# Patient Record
Sex: MALE | Race: WHITE | NOT HISPANIC OR LATINO | Employment: OTHER | URBAN - NONMETROPOLITAN AREA
[De-identification: names, ages, dates, MRNs, and addresses within clinical notes are randomized per-mention and may not be internally consistent; named-entity substitution may affect disease eponyms.]

---

## 2023-10-04 ENCOUNTER — APPOINTMENT (OUTPATIENT)
Dept: RADIOLOGY | Facility: HOSPITAL | Age: 68
End: 2023-10-04
Payer: OTHER GOVERNMENT

## 2023-10-04 ENCOUNTER — HOSPITAL ENCOUNTER (EMERGENCY)
Facility: HOSPITAL | Age: 68
Discharge: HOME | End: 2023-10-04
Attending: EMERGENCY MEDICINE | Admitting: EMERGENCY MEDICINE
Payer: OTHER GOVERNMENT

## 2023-10-04 ENCOUNTER — HOSPITAL ENCOUNTER (OUTPATIENT)
Dept: RADIOLOGY | Facility: HOSPITAL | Age: 68
Discharge: HOME | End: 2023-10-04
Payer: MEDICARE

## 2023-10-04 VITALS
HEART RATE: 50 BPM | TEMPERATURE: 97.8 F | DIASTOLIC BLOOD PRESSURE: 84 MMHG | WEIGHT: 230 LBS | OXYGEN SATURATION: 93 % | RESPIRATION RATE: 20 BRPM | HEIGHT: 72 IN | SYSTOLIC BLOOD PRESSURE: 142 MMHG | BODY MASS INDEX: 31.15 KG/M2

## 2023-10-04 DIAGNOSIS — R42 VERTIGO: ICD-10-CM

## 2023-10-04 DIAGNOSIS — R11.0 NAUSEA: ICD-10-CM

## 2023-10-04 DIAGNOSIS — R42 DIZZINESS: ICD-10-CM

## 2023-10-04 DIAGNOSIS — R07.9 CHEST PAIN, UNSPECIFIED TYPE: ICD-10-CM

## 2023-10-04 DIAGNOSIS — R42 DIZZINESS: Primary | ICD-10-CM

## 2023-10-04 DIAGNOSIS — H81.10 BENIGN PAROXYSMAL POSITIONAL VERTIGO, UNSPECIFIED LATERALITY: ICD-10-CM

## 2023-10-04 LAB
ALBUMIN SERPL BCP-MCNC: 4.2 G/DL (ref 3.4–5)
ALP SERPL-CCNC: 77 U/L (ref 33–136)
ALT SERPL W P-5'-P-CCNC: 17 U/L (ref 10–52)
ANION GAP SERPL CALC-SCNC: 10 MMOL/L (ref 10–20)
APPEARANCE UR: ABNORMAL
AST SERPL W P-5'-P-CCNC: 14 U/L (ref 9–39)
BASOPHILS # BLD AUTO: 0.08 X10*3/UL (ref 0–0.1)
BASOPHILS NFR BLD AUTO: 0.9 %
BILIRUB SERPL-MCNC: 0.6 MG/DL (ref 0–1.2)
BILIRUB UR STRIP.AUTO-MCNC: NEGATIVE MG/DL
BNP SERPL-MCNC: 41 PG/ML (ref 0–99)
BUN SERPL-MCNC: 19 MG/DL (ref 6–23)
CALCIUM SERPL-MCNC: 9.1 MG/DL (ref 8.6–10.3)
CARDIAC TROPONIN I PNL SERPL HS: 6 NG/L (ref 0–20)
CHLORIDE SERPL-SCNC: 111 MMOL/L (ref 98–107)
CO2 SERPL-SCNC: 24 MMOL/L (ref 21–32)
COLOR UR: YELLOW
CREAT SERPL-MCNC: 1.36 MG/DL (ref 0.5–1.3)
EOSINOPHIL # BLD AUTO: 0.26 X10*3/UL (ref 0–0.7)
EOSINOPHIL NFR BLD AUTO: 2.8 %
ERYTHROCYTE [DISTWIDTH] IN BLOOD BY AUTOMATED COUNT: 12.7 % (ref 11.5–14.5)
GFR SERPL CREATININE-BSD FRML MDRD: 57 ML/MIN/1.73M*2
GLUCOSE SERPL-MCNC: 155 MG/DL (ref 74–99)
GLUCOSE UR STRIP.AUTO-MCNC: NEGATIVE MG/DL
HCT VFR BLD AUTO: 45.2 % (ref 41–52)
HGB BLD-MCNC: 15.2 G/DL (ref 13.5–17.5)
IMM GRANULOCYTES # BLD AUTO: 0.04 X10*3/UL (ref 0–0.7)
IMM GRANULOCYTES NFR BLD AUTO: 0.4 % (ref 0–0.9)
INR PPP: 1 (ref 0.9–1.1)
KETONES UR STRIP.AUTO-MCNC: NEGATIVE MG/DL
LACTATE SERPL-SCNC: 1.6 MMOL/L (ref 0.4–2)
LEUKOCYTE ESTERASE UR QL STRIP.AUTO: NEGATIVE
LYMPHOCYTES # BLD AUTO: 3.53 X10*3/UL (ref 1.2–4.8)
LYMPHOCYTES NFR BLD AUTO: 37.5 %
MAGNESIUM SERPL-MCNC: 1.94 MG/DL (ref 1.6–2.4)
MCH RBC QN AUTO: 32.5 PG (ref 26–34)
MCHC RBC AUTO-ENTMCNC: 33.6 G/DL (ref 32–36)
MCV RBC AUTO: 97 FL (ref 80–100)
MONOCYTES # BLD AUTO: 0.56 X10*3/UL (ref 0.1–1)
MONOCYTES NFR BLD AUTO: 6 %
NEUTROPHILS # BLD AUTO: 4.94 X10*3/UL (ref 1.2–7.7)
NEUTROPHILS NFR BLD AUTO: 52.4 %
NITRITE UR QL STRIP.AUTO: NEGATIVE
NRBC BLD-RTO: 0 /100 WBCS (ref 0–0)
PH UR STRIP.AUTO: 7 [PH]
PLATELET # BLD AUTO: 264 X10*3/UL (ref 150–450)
PMV BLD AUTO: 8.9 FL (ref 7.5–11.5)
POTASSIUM SERPL-SCNC: 3.8 MMOL/L (ref 3.5–5.3)
PROT SERPL-MCNC: 7.3 G/DL (ref 6.4–8.2)
PROT UR STRIP.AUTO-MCNC: NEGATIVE MG/DL
PROTHROMBIN TIME: 11.4 SECONDS (ref 9.8–12.8)
RBC # BLD AUTO: 4.68 X10*6/UL (ref 4.5–5.9)
RBC # UR STRIP.AUTO: NEGATIVE /UL
SODIUM SERPL-SCNC: 141 MMOL/L (ref 136–145)
SP GR UR STRIP.AUTO: 1.02
UROBILINOGEN UR STRIP.AUTO-MCNC: <2 MG/DL
WBC # BLD AUTO: 9.4 X10*3/UL (ref 4.4–11.3)

## 2023-10-04 PROCEDURE — 80053 COMPREHEN METABOLIC PANEL: CPT | Performed by: EMERGENCY MEDICINE

## 2023-10-04 PROCEDURE — 83605 ASSAY OF LACTIC ACID: CPT | Performed by: EMERGENCY MEDICINE

## 2023-10-04 PROCEDURE — 70498 CT ANGIOGRAPHY NECK: CPT | Performed by: RADIOLOGY

## 2023-10-04 PROCEDURE — 99284 EMERGENCY DEPT VISIT MOD MDM: CPT | Mod: 25

## 2023-10-04 PROCEDURE — 70496 CT ANGIOGRAPHY HEAD: CPT | Performed by: RADIOLOGY

## 2023-10-04 PROCEDURE — 85025 COMPLETE CBC W/AUTO DIFF WBC: CPT | Performed by: EMERGENCY MEDICINE

## 2023-10-04 PROCEDURE — 2500000004 HC RX 250 GENERAL PHARMACY W/ HCPCS (ALT 636 FOR OP/ED): Performed by: EMERGENCY MEDICINE

## 2023-10-04 PROCEDURE — 70498 CT ANGIOGRAPHY NECK: CPT

## 2023-10-04 PROCEDURE — 81003 URINALYSIS AUTO W/O SCOPE: CPT | Performed by: EMERGENCY MEDICINE

## 2023-10-04 PROCEDURE — 36415 COLL VENOUS BLD VENIPUNCTURE: CPT | Performed by: EMERGENCY MEDICINE

## 2023-10-04 PROCEDURE — 99284 EMERGENCY DEPT VISIT MOD MDM: CPT | Performed by: EMERGENCY MEDICINE

## 2023-10-04 PROCEDURE — 83735 ASSAY OF MAGNESIUM: CPT | Performed by: EMERGENCY MEDICINE

## 2023-10-04 PROCEDURE — 85610 PROTHROMBIN TIME: CPT | Performed by: EMERGENCY MEDICINE

## 2023-10-04 PROCEDURE — 71045 X-RAY EXAM CHEST 1 VIEW: CPT | Mod: FR

## 2023-10-04 PROCEDURE — 83880 ASSAY OF NATRIURETIC PEPTIDE: CPT | Performed by: EMERGENCY MEDICINE

## 2023-10-04 PROCEDURE — 2550000001 HC RX 255 CONTRASTS: Performed by: EMERGENCY MEDICINE

## 2023-10-04 PROCEDURE — 71045 X-RAY EXAM CHEST 1 VIEW: CPT | Mod: FOREIGN READ | Performed by: RADIOLOGY

## 2023-10-04 PROCEDURE — 84484 ASSAY OF TROPONIN QUANT: CPT | Performed by: EMERGENCY MEDICINE

## 2023-10-04 RX ORDER — MECLIZINE HYDROCHLORIDE 25 MG/1
25 TABLET ORAL 3 TIMES DAILY PRN
Qty: 21 TABLET | Refills: 0 | Status: SHIPPED | OUTPATIENT
Start: 2023-10-04 | End: 2023-10-11

## 2023-10-04 RX ADMIN — SODIUM CHLORIDE 1000 ML: 9 INJECTION, SOLUTION INTRAVENOUS at 07:54

## 2023-10-04 ASSESSMENT — PAIN SCALES - GENERAL
PAINLEVEL_OUTOF10: 2
PAINLEVEL_OUTOF10: 3

## 2023-10-04 ASSESSMENT — PAIN - FUNCTIONAL ASSESSMENT
PAIN_FUNCTIONAL_ASSESSMENT: 0-10
PAIN_FUNCTIONAL_ASSESSMENT: 0-10

## 2023-10-04 ASSESSMENT — COLUMBIA-SUICIDE SEVERITY RATING SCALE - C-SSRS
6. HAVE YOU EVER DONE ANYTHING, STARTED TO DO ANYTHING, OR PREPARED TO DO ANYTHING TO END YOUR LIFE?: NO
1. IN THE PAST MONTH, HAVE YOU WISHED YOU WERE DEAD OR WISHED YOU COULD GO TO SLEEP AND NOT WAKE UP?: NO
2. HAVE YOU ACTUALLY HAD ANY THOUGHTS OF KILLING YOURSELF?: NO

## 2023-10-04 NOTE — Clinical Note
Mulugeta Spaulding was seen and treated in our emergency department on 10/4/2023.  He may return to work on 10/09/2023.       If you have any questions or concerns, please don't hesitate to call.      Clarence Urbano, DO

## 2023-10-04 NOTE — ED PROVIDER NOTES
Department of Emergency Medicine   ED  Provider Note  Admit Date/RoomTime: 10/4/2023  7:10 AM  ED Room: AC02/AC02                  History of Present Illness:   Mulugeta Spaulding is a 68 y.o. male presenting to the ED for vertiginous presentation, diaphoresis, nausea, as well as chest pain., beginning last night however patient states he has had intermittent symptoms throughout the day yesterday as well as previously and his symptoms were attributed to having a complex migraine.  Of note patient states that he did develop similar symptoms with vertigo hearing difficulty as well as intermittent disorientation after he had COVID in 2019.  The complaint has been intermittent, moderate in severity, and worsened by  positional changes especially with movement of head or eyes to the right. .  Patient states he has previously seen a neurologist who recommend him not to be on meclizine or Zofran for this however he states meclizine did help him with his symptoms.  Patient states he has not followed up with neurologist at this point.  Patient states he is on Topamax chronically for having migraine headaches as well.      Review of Systems:   Pertinent positives and review of systems as noted above.  Remaining 10 review of systems is negative or noncontributory to today's episode of care.        --------------------------------------------- PAST HISTORY ---------------------------------------------  Past Medical History:  has a past medical history of Vertigo.    Past Surgical History:  has a past surgical history that includes CT angio head w and wo IV contrast (7/4/2018) and CT angio neck w and wo IV contrast (7/4/2018).    Social History:  reports that he has quit smoking. His smoking use included cigarettes. He has never used smokeless tobacco.    Family History: family history is not on file. Unless otherwise noted, family history is non contributory    The patient’s home medications have been reviewed.    Allergies: Patient  has no known allergies.    -------------------------------------------------- RESULTS -------------------------------------------------  All laboratory and radiology results have been personally reviewed by myself   LABS:  Labs Reviewed   COMPREHENSIVE METABOLIC PANEL - Abnormal       Result Value    Glucose 155 (*)     Sodium 141      Potassium 3.8      Chloride 111 (*)     Bicarbonate 24      Anion Gap 10      Urea Nitrogen 19      Creatinine 1.36 (*)     eGFR 57 (*)     Calcium 9.1      Albumin 4.2      Alkaline Phosphatase 77      Total Protein 7.3      AST 14      Bilirubin, Total 0.6      ALT 17     URINALYSIS WITH REFLEX MICROSCOPIC - Abnormal    Color, Urine Yellow      Appearance, Urine Hazy (*)     Specific Gravity, Urine 1.019      pH, Urine 7.0      Protein, Urine NEGATIVE      Glucose, Urine NEGATIVE      Blood, Urine NEGATIVE      Ketones, Urine NEGATIVE      Bilirubin, Urine NEGATIVE      Urobilinogen, Urine <2.0      Nitrite, Urine NEGATIVE      Leukocyte Esterase, Urine NEGATIVE     TROPONIN I, HIGH SENSITIVITY - Normal    Troponin I, High Sensitivity 6      Narrative:     Less than 99th percentile of normal range cutoff-  Female and children under 18 years old <14 ng/L; Male <21 ng/L: Negative  Repeat testing should be performed if clinically indicated.     Female and children under 18 years old 14-50 ng/L; Male 21-50 ng/L:  Consistent with possible cardiac damage and possible increased clinical   risk. Serial measurements may help to assess extent of myocardial damage.     >50 ng/L: Consistent with cardiac damage, increased clinical risk and  myocardial infarction. Serial measurements may help assess extent of   myocardial damage.      NOTE: Children less than 1 year old may have higher baseline troponin   levels and results should be interpreted in conjunction with the overall   clinical context.     NOTE: Troponin I testing is performed using a different   testing methodology at [facility] than at other   [facility]. Direct result comparisons should only   be made within the same method.   PROTIME-INR - Normal    Protime 11.4      INR 1.0     MAGNESIUM - Normal    Magnesium 1.94     B-TYPE NATRIURETIC PEPTIDE - Normal    BNP 41      Narrative:        <100 pg/mL - Heart failure unlikely  100-299 pg/mL - Intermediate probability of acute heart                  failure exacerbation. Correlate with clinical                  context and patient history.    >=300 pg/mL - Heart Failure likely. Correlate with clinical                  context and patient history.    BNP testing is performed using different testing methodology at [facility] than at other [facility] hospitals. Direct result comparisons should only be made within the same method.      LACTATE - Normal    Lactate 1.6      Narrative:     Venipuncture immediately after or during the administration of Metamizole may lead to falsely low results. Testing should be performed immediately  prior to Metamizole dosing.   CBC WITH AUTO DIFFERENTIAL    WBC 9.4      nRBC 0.0      RBC 4.68      Hemoglobin 15.2      Hematocrit 45.2      MCV 97      MCH 32.5      MCHC 33.6      RDW 12.7      Platelets 264      MPV 8.9      Neutrophils % 52.4      Immature Granulocytes %, Automated 0.4      Lymphocytes % 37.5      Monocytes % 6.0      Eosinophils % 2.8      Basophils % 0.9      Neutrophils Absolute 4.94      Immature Granulocytes Absolute, Automated 0.04      Lymphocytes Absolute 3.53      Monocytes Absolute 0.56      Eosinophils Absolute 0.26      Basophils Absolute 0.08     GRAY TOP         RADIOLOGY:  Interpreted by Radiologist.  XR chest 1 view   Final Result   No findings of an acute cardiopulmonary process.   Signed by Srini Chaudhary MD          No results found for this or any previous visit (from the past 4464 hour(s)).  ------------------------- NURSING NOTES AND VITALS REVIEWED ---------------------------   The nursing  notes within the ED encounter and vital signs as below have been reviewed.   /84   Pulse 50   Temp 36.6 °C (97.8 °F)   Resp 20   Ht 1.829 m (6')   Wt 104 kg (230 lb)   SpO2 93%   BMI 31.19 kg/m²   Oxygen Saturation Interpretation: Normal      ---------------------------------------------------PHYSICAL EXAM--------------------------------------    Constitutional/General: Alert and oriented x3, well appearing, non toxic in NAD  Head: Normocephalic and atraumatic  Eyes: PERRL, EOMI, conjunctiva normal, sclera non icteric  Mouth: Oropharynx clear, handling secretions, no trismus, no asymmetry of the posterior oropharynx or uvular edema  Neck: Supple, full ROM, non tender to palpation in the midline, no stridor, no crepitus, no meningeal signs  Respiratory: Lungs clear to auscultation bilaterally, no wheezes, rales, or rhonchi. Not in respiratory distress  Cardiovascular:  Regular rate. Regular rhythm. No murmurs, gallops, or rubs. 2+ distal pulses  Chest: No chest wall tenderness  GI:  Abdomen Soft, Non tender, Non distended.  +BS. No organomegaly, no palpable masses,  No rebound, guarding, or rigidity.   Musculoskeletal: Moves all extremities x 4. Warm and well perfused, no clubbing, cyanosis, or edema. Capillary refill <3 seconds  Integument: skin warm and dry. No rashes.   Lymphatic: no lymphadenopathy noted  Neurologic: GCS 15, no focal deficits, symmetric strength 5/5 in the upper and lower extremities bilaterally  Psychiatric: Normal Affect    Procedures    EKG read:  Obtained at 7:14 AM on October 4, 2023 and read by me at 715.  Rhythm: Sinus rhythm  Rate: 50  Intervals: OR QRS QTc intervals are within normal limits.  Axis: Within normal limits.  ST segments: Within normal limits.  T waves: Unchanged with no signs of flipping or flattening, otherwise unremarkable.  In comparison to his EKG from July 5, 2018 not significantly changed    ------------------------------ ED COURSE/MEDICAL DECISION  MAKING----------------------    Medical Decision Making:   Patient was seen and examined in the ER.  Patient is presenting with positional changes developing vertiginous symptoms especially with moving his head to the right as well as movement of the eyes to the right.  Patient states when he does develop the symptoms, patient complains of having significant nausea with this.  Patient states that this morning he had some chest pain as well as some diaphoresis associated with it as well.  Patient denies any preceding URI symptoms about this episode.  Patient states remotely he has had COVID with similar presentation with having complex migraines as well as vertigo.  Patient denies any trauma to his head.  Patient states he does have a history of remote TBI in the  without any head bleeding.  Patient denies being on any blood thinners.  On examination patient does not have any focal neurological deficits.  As far as having a posterior stroke, patient is not exhibiting any visual field deficit, any dysmetria and as well as does not have any ataxia.    Patient at this point has extensive laboratory work-up as well as CT imaging pending and states he signed out to the a.m. physician Dr. Urbano for evaluation and management of this patient's presentation.  Diagnoses as of 10/04/23 2227   Dizziness   Chest pain, unspecified type   Nausea   Benign paroxysmal positional vertigo, unspecified laterality      Counseling:   Currently awaiting for laboratory as well as imaging for better determination of patient current presentation in regard to his dizziness, exacerbated with head movement and movement of the eyes to the right, nausea and diaphoresis as well as chest pain.    --------------------------------- IMPRESSION AND DISPOSITION ---------------------------------    Diagnoses as of 10/04/23 2227   Dizziness   Chest pain, unspecified type   Nausea   Benign paroxysmal positional vertigo, unspecified laterality         IMPRESSION  1. Dizziness    2. Chest pain, unspecified type    3. Nausea    4. Benign paroxysmal positional vertigo, unspecified laterality        DISPOSITION  Disposition:  pending  Patient condition is stable      Billing Provider Critical Care Time: None      Danny Gil,   10/04/23 0759       Danny Gil,   10/04/23 [id number]

## 2023-10-04 NOTE — ED TRIAGE NOTES
Ambulatory to ED, A&Ox3 for dizziness that patient awoke with. Patient has CP as well. Hx of Vertigo.

## 2023-10-04 NOTE — ED PROVIDER NOTES
This is a handoff from previous provider.  I assumed care at 0800 from Dr. PETE Gil on shift change.    68-year-old male here with dizziness.  Described as a spinning sensation.  Diaphoresis.  Vague chest discomfort.  Please see HPI and charting as noted by Dr. STRAUSS and this was reviewed.    EKG at 0714 interpreted by me.  Sinus bradycardia 50 bpm.  Axis normal.  ND, QRS, QT intervals are normal.  No acute ST-T change.  No evidence of acute ischemia.  No STEMI.    MDM/ED COURSE:  The radiologist called me.  Patient's head CTA is grossly negative.  No evidence of stroke.  Patient's orthostatic vital signs were obtained.  They are negative.  Patient's laboratory testing is unremarkable.  Reevaluation here at 9:20 AM reveals the patient to be resting comfortably.  Reviewed all findings with the patient.  Currently believe he has benign positional vertigo.  Patient will be discharged home.  Patient is instructed to follow-up with his primary care in 3 to 5 days.  Sooner if worse return.    Heart Rate:  [46-54]   Temp:  [36.9 °C (98.4 °F)]   Resp:  [0-18]   BP: (136-139)/(84-95)   Height:  [182.9 cm (6')]   Weight:  [104 kg (230 lb)]   SpO2:  [93 %-96 %]      Labs Reviewed   COMPREHENSIVE METABOLIC PANEL - Abnormal       Result Value    Glucose 155 (*)     Sodium 141      Potassium 3.8      Chloride 111 (*)     Bicarbonate 24      Anion Gap 10      Urea Nitrogen 19      Creatinine 1.36 (*)     eGFR 57 (*)     Calcium 9.1      Albumin 4.2      Alkaline Phosphatase 77      Total Protein 7.3      AST 14      Bilirubin, Total 0.6      ALT 17     URINALYSIS WITH REFLEX MICROSCOPIC - Abnormal    Color, Urine Yellow      Appearance, Urine Hazy (*)     Specific Gravity, Urine 1.019      pH, Urine 7.0      Protein, Urine NEGATIVE      Glucose, Urine NEGATIVE      Blood, Urine NEGATIVE      Ketones, Urine NEGATIVE      Bilirubin, Urine NEGATIVE      Urobilinogen, Urine <2.0      Nitrite, Urine NEGATIVE      Leukocyte Esterase,  Urine NEGATIVE     TROPONIN I, HIGH SENSITIVITY - Normal    Troponin I, High Sensitivity 6      Narrative:     Less than 99th percentile of normal range cutoff-  Female and children under 18 years old <14 ng/L; Male <21 ng/L: Negative  Repeat testing should be performed if clinically indicated.     Female and children under 18 years old 14-50 ng/L; Male 21-50 ng/L:  Consistent with possible cardiac damage and possible increased clinical   risk. Serial measurements may help to assess extent of myocardial damage.     >50 ng/L: Consistent with cardiac damage, increased clinical risk and  myocardial infarction. Serial measurements may help assess extent of   myocardial damage.      NOTE: Children less than 1 year old may have higher baseline troponin   levels and results should be interpreted in conjunction with the overall   clinical context.     NOTE: Troponin I testing is performed using a different   testing methodology at [facility] than at other   [facility]. Direct result comparisons should only   be made within the same method.   PROTIME-INR - Normal    Protime 11.4      INR 1.0     MAGNESIUM - Normal    Magnesium 1.94     LACTATE - Normal    Lactate 1.6      Narrative:     Venipuncture immediately after or during the administration of Metamizole may lead to falsely low results. Testing should be performed immediately  prior to Metamizole dosing.   CBC WITH AUTO DIFFERENTIAL    WBC 9.4      nRBC 0.0      RBC 4.68      Hemoglobin 15.2      Hematocrit 45.2      MCV 97      MCH 32.5      MCHC 33.6      RDW 12.7      Platelets 264      MPV 8.9      Neutrophils % 52.4      Immature Granulocytes %, Automated 0.4      Lymphocytes % 37.5      Monocytes % 6.0      Eosinophils % 2.8      Basophils % 0.9      Neutrophils Absolute 4.94      Immature Granulocytes Absolute, Automated 0.04      Lymphocytes Absolute 3.53      Monocytes Absolute 0.56      Eosinophils Absolute 0.26      Basophils Absolute 0.08      GRAY TOP   B-TYPE NATRIURETIC PEPTIDE      XR chest 1 view   Final Result   No findings of an acute cardiopulmonary process.   Signed by Srini Chaudhary MD      CT angio head neck w and wo IV contrast    (Results Pending)          Clarence Urbano, DO  10/04/23 0917       Clarence Urbano, DO  10/04/23 0938

## 2023-10-21 PROCEDURE — 2550000001 HC RX 255 CONTRASTS: Performed by: EMERGENCY MEDICINE

## 2023-10-21 RX ADMIN — IOHEXOL 50 ML: 350 INJECTION, SOLUTION INTRAVENOUS at 09:17

## 2023-11-30 ENCOUNTER — HOSPITAL ENCOUNTER (OUTPATIENT)
Dept: CARDIOLOGY | Facility: HOSPITAL | Age: 68
Discharge: HOME | End: 2023-11-30
Payer: MEDICARE

## 2023-11-30 PROCEDURE — 93005 ELECTROCARDIOGRAM TRACING: CPT

## 2023-12-14 ENCOUNTER — APPOINTMENT (OUTPATIENT)
Dept: RADIOLOGY | Facility: HOSPITAL | Age: 68
End: 2023-12-14
Payer: MEDICARE

## 2023-12-14 ENCOUNTER — HOSPITAL ENCOUNTER (EMERGENCY)
Facility: HOSPITAL | Age: 68
Discharge: OTHER INSTITUTION | End: 2023-12-14
Attending: EMERGENCY MEDICINE
Payer: MEDICARE

## 2023-12-14 ENCOUNTER — APPOINTMENT (OUTPATIENT)
Dept: CARDIOLOGY | Facility: HOSPITAL | Age: 68
End: 2023-12-14
Payer: MEDICARE

## 2023-12-14 VITALS
HEIGHT: 72 IN | HEART RATE: 53 BPM | DIASTOLIC BLOOD PRESSURE: 82 MMHG | OXYGEN SATURATION: 97 % | SYSTOLIC BLOOD PRESSURE: 143 MMHG | RESPIRATION RATE: 14 BRPM | WEIGHT: 228 LBS | BODY MASS INDEX: 30.88 KG/M2 | TEMPERATURE: 97.9 F

## 2023-12-14 DIAGNOSIS — R42 VERTIGO: Primary | ICD-10-CM

## 2023-12-14 LAB
ANION GAP SERPL CALC-SCNC: 10 MMOL/L (ref 10–20)
BASOPHILS # BLD AUTO: 0.07 X10*3/UL (ref 0–0.1)
BASOPHILS NFR BLD AUTO: 0.9 %
BUN SERPL-MCNC: 18 MG/DL (ref 6–23)
CALCIUM SERPL-MCNC: 9.4 MG/DL (ref 8.6–10.3)
CARDIAC TROPONIN I PNL SERPL HS: 6 NG/L (ref 0–20)
CHLORIDE SERPL-SCNC: 108 MMOL/L (ref 98–107)
CO2 SERPL-SCNC: 26 MMOL/L (ref 21–32)
CREAT SERPL-MCNC: 1.3 MG/DL (ref 0.5–1.3)
EOSINOPHIL # BLD AUTO: 0.26 X10*3/UL (ref 0–0.7)
EOSINOPHIL NFR BLD AUTO: 3.5 %
ERYTHROCYTE [DISTWIDTH] IN BLOOD BY AUTOMATED COUNT: 12.7 % (ref 11.5–14.5)
FLUAV RNA RESP QL NAA+PROBE: NOT DETECTED
FLUBV RNA RESP QL NAA+PROBE: NOT DETECTED
GFR SERPL CREATININE-BSD FRML MDRD: 60 ML/MIN/1.73M*2
GLUCOSE SERPL-MCNC: 118 MG/DL (ref 74–99)
HCT VFR BLD AUTO: 44.5 % (ref 41–52)
HGB BLD-MCNC: 14.8 G/DL (ref 13.5–17.5)
HOLD SPECIMEN: NORMAL
HOLD SPECIMEN: NORMAL
IMM GRANULOCYTES # BLD AUTO: 0.03 X10*3/UL (ref 0–0.7)
IMM GRANULOCYTES NFR BLD AUTO: 0.4 % (ref 0–0.9)
LACTATE SERPL-SCNC: 1 MMOL/L (ref 0.4–2)
LYMPHOCYTES # BLD AUTO: 2.69 X10*3/UL (ref 1.2–4.8)
LYMPHOCYTES NFR BLD AUTO: 35.8 %
MAGNESIUM SERPL-MCNC: 2.01 MG/DL (ref 1.6–2.4)
MCH RBC QN AUTO: 32.2 PG (ref 26–34)
MCHC RBC AUTO-ENTMCNC: 33.3 G/DL (ref 32–36)
MCV RBC AUTO: 97 FL (ref 80–100)
MONOCYTES # BLD AUTO: 0.51 X10*3/UL (ref 0.1–1)
MONOCYTES NFR BLD AUTO: 6.8 %
NEUTROPHILS # BLD AUTO: 3.96 X10*3/UL (ref 1.2–7.7)
NEUTROPHILS NFR BLD AUTO: 52.6 %
NRBC BLD-RTO: 0 /100 WBCS (ref 0–0)
PLATELET # BLD AUTO: 247 X10*3/UL (ref 150–450)
POTASSIUM SERPL-SCNC: 3.8 MMOL/L (ref 3.5–5.3)
RBC # BLD AUTO: 4.6 X10*6/UL (ref 4.5–5.9)
RSV RNA RESP QL NAA+PROBE: NOT DETECTED
SARS-COV-2 RNA RESP QL NAA+PROBE: NOT DETECTED
SODIUM SERPL-SCNC: 140 MMOL/L (ref 136–145)
WBC # BLD AUTO: 7.5 X10*3/UL (ref 4.4–11.3)

## 2023-12-14 PROCEDURE — 99285 EMERGENCY DEPT VISIT HI MDM: CPT | Performed by: EMERGENCY MEDICINE

## 2023-12-14 PROCEDURE — 36415 COLL VENOUS BLD VENIPUNCTURE: CPT | Performed by: EMERGENCY MEDICINE

## 2023-12-14 PROCEDURE — 71046 X-RAY EXAM CHEST 2 VIEWS: CPT | Mod: FY

## 2023-12-14 PROCEDURE — 80048 BASIC METABOLIC PNL TOTAL CA: CPT | Performed by: EMERGENCY MEDICINE

## 2023-12-14 PROCEDURE — 70498 CT ANGIOGRAPHY NECK: CPT | Performed by: RADIOLOGY

## 2023-12-14 PROCEDURE — 70498 CT ANGIOGRAPHY NECK: CPT

## 2023-12-14 PROCEDURE — 2500000001 HC RX 250 WO HCPCS SELF ADMINISTERED DRUGS (ALT 637 FOR MEDICARE OP): Performed by: EMERGENCY MEDICINE

## 2023-12-14 PROCEDURE — 93005 ELECTROCARDIOGRAM TRACING: CPT

## 2023-12-14 PROCEDURE — 71046 X-RAY EXAM CHEST 2 VIEWS: CPT | Performed by: RADIOLOGY

## 2023-12-14 PROCEDURE — 84484 ASSAY OF TROPONIN QUANT: CPT | Performed by: EMERGENCY MEDICINE

## 2023-12-14 PROCEDURE — 70450 CT HEAD/BRAIN W/O DYE: CPT

## 2023-12-14 PROCEDURE — 70450 CT HEAD/BRAIN W/O DYE: CPT | Performed by: RADIOLOGY

## 2023-12-14 PROCEDURE — 85025 COMPLETE CBC W/AUTO DIFF WBC: CPT | Performed by: EMERGENCY MEDICINE

## 2023-12-14 PROCEDURE — 83735 ASSAY OF MAGNESIUM: CPT | Performed by: EMERGENCY MEDICINE

## 2023-12-14 PROCEDURE — 70496 CT ANGIOGRAPHY HEAD: CPT | Performed by: RADIOLOGY

## 2023-12-14 PROCEDURE — 70496 CT ANGIOGRAPHY HEAD: CPT

## 2023-12-14 PROCEDURE — 87637 SARSCOV2&INF A&B&RSV AMP PRB: CPT | Performed by: EMERGENCY MEDICINE

## 2023-12-14 PROCEDURE — 2550000001 HC RX 255 CONTRASTS: Performed by: EMERGENCY MEDICINE

## 2023-12-14 PROCEDURE — 83605 ASSAY OF LACTIC ACID: CPT | Performed by: EMERGENCY MEDICINE

## 2023-12-14 RX ORDER — MECLIZINE HYDROCHLORIDE 25 MG/1
25 TABLET ORAL ONCE
Status: COMPLETED | OUTPATIENT
Start: 2023-12-14 | End: 2023-12-14

## 2023-12-14 RX ORDER — TOPIRAMATE 25 MG/1
25 TABLET ORAL 2 TIMES DAILY
COMMUNITY

## 2023-12-14 RX ORDER — AMLODIPINE BESYLATE 5 MG/1
TABLET ORAL DAILY
COMMUNITY

## 2023-12-14 RX ADMIN — MECLIZINE HYDROCHLORIDE 25 MG: 25 TABLET ORAL at 11:24

## 2023-12-14 RX ADMIN — IOHEXOL 50 ML: 350 INJECTION, SOLUTION INTRAVENOUS at 12:00

## 2023-12-14 ASSESSMENT — ENCOUNTER SYMPTOMS
NAUSEA: 1
DIZZINESS: 1
LIGHT-HEADEDNESS: 1

## 2023-12-14 ASSESSMENT — COLUMBIA-SUICIDE SEVERITY RATING SCALE - C-SSRS
2. HAVE YOU ACTUALLY HAD ANY THOUGHTS OF KILLING YOURSELF?: NO
1. IN THE PAST MONTH, HAVE YOU WISHED YOU WERE DEAD OR WISHED YOU COULD GO TO SLEEP AND NOT WAKE UP?: NO
6. HAVE YOU EVER DONE ANYTHING, STARTED TO DO ANYTHING, OR PREPARED TO DO ANYTHING TO END YOUR LIFE?: NO

## 2023-12-14 ASSESSMENT — PAIN SCALES - GENERAL
PAINLEVEL_OUTOF10: 0 - NO PAIN
PAINLEVEL_OUTOF10: 0 - NO PAIN

## 2023-12-14 ASSESSMENT — PAIN - FUNCTIONAL ASSESSMENT: PAIN_FUNCTIONAL_ASSESSMENT: 0-10

## 2023-12-14 NOTE — ED PROVIDER NOTES
History:  Chief Complaint   Patient presents with    Dizziness     HPI    History of Present Illness:  HPI    History of Present Illness:    Patient information was obtained from: Patient, Wife  History/exam Limitations: None  Patient resented to the Emergency Department: Private vehicle    Chief Complaint: Vertigo    HPI:  Mulugeta Spaulding, 68 y.o. male presents today with: Patient presents with a sudden onset of vertigo.  Happened today while he was driving.  Has since started to improve.  States that it felt like the room was spinning.  States he is very off balance.  States that he was losing his peripheral vision.  Has since resolved.  Does have a history of having this in the past.  Had a workup in October, [facility].  Did have some nausea but no vomiting.  Symptoms are moderate and improving.    Past Medical History:    Past Medical History:   Diagnosis Date    Vertigo        Past Surgical History:   Procedure Laterality Date    CT ANGIO NECK  7/4/2018    CT NECK ANGIO W AND WO IV CONTRAST 7/4/2018 [facility]    CT ANGIO NECK  12/14/2023    CT ANGIO NECK 12/14/2023 CON CT    CT HEAD ANGIO W AND WO IV CONTRAST  7/4/2018    CT HEAD ANGIO W AND WO IV CONTRAST 7/4/2018 [facility]    CT HEAD ANGIO W AND WO IV CONTRAST  12/14/2023    CT HEAD ANGIO W AND WO IV CONTRAST 12/14/2023 CON CT       No family history on file.    No Known Allergies    Social History     Tobacco Use    Smoking status: Former     Types: Cigarettes    Smokeless tobacco: Never     Past medical, surgical, social, and family history that was noted in the nursing note was also reviewed.    ROS:  Review of Systems   Gastrointestinal:  Positive for nausea.   Neurological:  Positive for dizziness and light-headedness.   All other systems reviewed and are negative.      Physical Exam:  ED Triage Vitals   Temp Heart Rate Resp BP   12/14/23 1108 12/14/23 1108 12/14/23 1108 12/14/23 1108    36.6 °C (97.9 °F) 63 18 (!) 149/94      SpO2 Temp src Heart Rate Source Patient Position   12/14/23 1108 -- -- 12/14/23 1130   100 %   Lying      BP Location FiO2 (%)     -- --             Vitals: Blood pressure 144/86, pulse 53, temperature 36.6 °C (97.9 °F), resp. rate 14, height 1.829 m (6'), weight 103 kg (228 lb), SpO2 96 %.  Vitals:    12/14/23 1330 12/14/23 1345 12/14/23 1400 12/14/23 1415   BP:    144/86   Patient Position:       Pulse: 53 53 68 53   Resp: 12 17 21 14   Temp:       SpO2: 98% 100% 100% 96%   Weight:       Height:           Physical Exam  Vitals and nursing note reviewed.   Constitutional:       General: He is not in acute distress.     Appearance: He is well-developed.   HENT:      Head: Normocephalic and atraumatic.   Eyes:      Extraocular Movements: Extraocular movements intact.      Conjunctiva/sclera: Conjunctivae normal.      Pupils: Pupils are equal, round, and reactive to light.      Comments: No nystagmus.   Cardiovascular:      Rate and Rhythm: Normal rate and regular rhythm.      Heart sounds: No murmur heard.  Pulmonary:      Effort: Pulmonary effort is normal. No respiratory distress.      Breath sounds: Normal breath sounds.   Abdominal:      Palpations: Abdomen is soft.      Tenderness: There is no abdominal tenderness.   Musculoskeletal:         General: No swelling.      Cervical back: Neck supple.   Skin:     General: Skin is warm and dry.      Capillary Refill: Capillary refill takes less than 2 seconds.   Neurological:      General: No focal deficit present.      Mental Status: He is alert and oriented to person, place, and time.      Cranial Nerves: No cranial nerve deficit.      Sensory: No sensory deficit.      Motor: No weakness.      Gait: Gait abnormal.   Psychiatric:         Mood and Affect: Mood normal.         ED Course:  Diagnostic test reviewed:  ED Course as of 12/14/23 1439   Thu Dec 14, 2023   1117 ECG 12 lead  ED physician interpretation of EKG: Normal  sinus rhythm, previous tracings available for comparison, 10/4/23, no significant interval change.   Rate 62.  Narrow QRS.  Normal SD interval.  No acute ST segment changes.  No ectopy.  Normal QTc.  [SD]   1156 Troponin I, High Sensitivity  Normal [SD]   1156 Lactate  Normal [SD]   1156 CBC and Auto Differential  Normal [SD]   1156 Basic metabolic panel(!)  Unremarkable [SD]   1156 Magnesium  Normal [SD]   1227 RSV PCR  Negative [SD]   1227 SARS-CoV-2 RT PCR  Negative [SD]   1227 Influenza A, and B PCR  Negative [SD]      ED Course User Index  [SD] Ludwig Armenta DO         Diagnoses as of 12/14/23 1439   Vertigo            NIH Stroke Scale  1A. Level of Consciousness: Alert, Keenly Responsive (12/14/23 1241 : Ludwig Armenta DO)  1B. Ask Month and Age: Both Questions Right (12/14/23 1241 : Ludwig Armenta DO)  1C. Blink Eyes & Squeeze Hands: Performs Both Tasks (12/14/23 1241 : Ludwig Armenta DO)  2. Best Gaze: Normal (12/14/23 1241 : Ludwig Armenta DO)  3. Visual: No Visual Loss (12/14/23 1241 : Ludwig Armenta DO)  4. Facial Palsy: Normal Symmetrical Movements (12/14/23 1241 : Ludwig Armenta DO)  5A. Motor - Left Arm: No Drift (12/14/23 1241 : Ludwig Armenta DO)  5B. Motor - Right Arm: No Drift (12/14/23 1241 : Ludwig Armenta DO)  6A. Motor - Left Leg: No Drift (12/14/23 1241 : Ludwig Armenta DO)  6B. Motor - Right Leg: No Drift (12/14/23 1241 : Ludwig Armenta DO)  7. Limb Ataxia: Absent (12/14/23 1241 : Ludwig Armenta DO)  8. Sensory Loss: Normal (12/14/23 1241 : Ludwig Armenta DO)  9. Best Language: No Aphasia (12/14/23 1241 : Ludwig Armenta DO)  10. Dysarthria: Normal (12/14/23 1241 : Ludwig Armenta DO)  11. Extinction and Inattention: No Abnormality (12/14/23 1241 : Ludwig Armenta DO)  NIH Stroke Scale: 0 (12/14/23 1241 : Ludwig Armenta DO)    Administrated medications:  Medications   meclizine (Antivert) tablet 25 mg (25 mg oral Given 12/14/23 1124)   iohexol  (OMNIPaque) 350 mg iodine/mL solution 50 mL (50 mL intravenous Given 12/14/23 1200)       New Prescriptions    No medications on file       Test ordered and reviewed:  Labs Reviewed   BASIC METABOLIC PANEL - Abnormal       Result Value    Glucose 118 (*)     Sodium 140      Potassium 3.8      Chloride 108 (*)     Bicarbonate 26      Anion Gap 10      Urea Nitrogen 18      Creatinine 1.30      eGFR 60 (*)     Calcium 9.4     MAGNESIUM - Normal    Magnesium 2.01     TROPONIN I, HIGH SENSITIVITY - Normal    Troponin I, High Sensitivity 6      Narrative:     Less than 99th percentile of normal range cutoff-  Female and children under 18 years old <14 ng/L; Male <21 ng/L: Negative  Repeat testing should be performed if clinically indicated.     Female and children under 18 years old 14-50 ng/L; Male 21-50 ng/L:  Consistent with possible cardiac damage and possible increased clinical   risk. Serial measurements may help to assess extent of myocardial damage.     >50 ng/L: Consistent with cardiac damage, increased clinical risk and  myocardial infarction. Serial measurements may help assess extent of   myocardial damage.      NOTE: Children less than 1 year old may have higher baseline troponin   levels and results should be interpreted in conjunction with the overall   clinical context.     NOTE: Troponin I testing is performed using a different   testing methodology at [facility] than at other   [facility]. Direct result comparisons should only   be made within the same method.   LACTATE - Normal    Lactate 1.0      Narrative:     Venipuncture immediately after or during the administration of Metamizole may lead to falsely low results. Testing should be performed immediately  prior to Metamizole dosing.   INFLUENZA A AND B PCR - Normal    Flu A Result Not Detected      Flu B Result Not Detected      Narrative:     This assay is an in vitro diagnostic multiplex nucleic acid amplification test for the  detection and discrimination of Influenza A & B from nasopharyngeal specimens, and has been validated for use at [facility]. Negative results do not preclude Influenza A/B infections, and should not be used as the sole basis for diagnosis, treatment, or other management decisions. If Influenza A/B and RSV PCR results are negative, testing for Parainfluenza virus, Adenovirus and Metapneumovirus is routinely performed for [facility] [place] and intensive care inpatients, and is available on other patients by placing an add-on request.   SARS-COV-2 PCR, SYMPTOMATIC - Normal    Coronavirus 2019, PCR Not Detected      Narrative:     This assay has received FDA Emergency Use Authorization (EUA) and is only authorized for the duration of time that circumstances exist to justify the authorization of the emergency use of in vitro diagnostic tests for the detection of SARS-CoV-2 virus and/or diagnosis of COVID-19 infection under section 564(b)(1) of the Act, 21 U.S.C. 360bbb-3(b)(1). This assay is an in vitro diagnostic nucleic acid amplification test for the qualitative detection of SARS-CoV-2 from nasopharyngeal specimens and has been validated for use at [facility]. Negative results do not preclude COVID-19 infections and should not be used as the sole basis for diagnosis, treatment, or other management decisions.     RSV PCR - Normal    RSV PCR Not Detected      Narrative:     This assay is an FDA-cleared, in vitro diagnostic nucleic acid amplification test for the detection of RSV from nasopharyngeal specimens, and has been validated for use at [facility]. Negative results do not preclude RSV infections, and should not be used as the sole basis for diagnosis, treatment, or other management decisions. If Influenza A/B and RSV PCR results are negative, testing for Parainfluenza virus, Adenovirus and Metapneumovirus is routinely performed for  pediatric oncology and intensive care inpatients at [facility], and is available on other patients by placing an add-on request.       CBC WITH AUTO DIFFERENTIAL    WBC 7.5      nRBC 0.0      RBC 4.60      Hemoglobin 14.8      Hematocrit 44.5      MCV 97      MCH 32.2      MCHC 33.3      RDW 12.7      Platelets 247      Neutrophils % 52.6      Immature Granulocytes %, Automated 0.4      Lymphocytes % 35.8      Monocytes % 6.8      Eosinophils % 3.5      Basophils % 0.9      Neutrophils Absolute 3.96      Immature Granulocytes Absolute, Automated 0.03      Lymphocytes Absolute 2.69      Monocytes Absolute 0.51      Eosinophils Absolute 0.26      Basophils Absolute 0.07         XR chest 2 views   Final Result   1.  No evidence of acute cardiopulmonary process.             Signed by: Sergei Piña 12/14/2023 12:22 PM   Dictation workstation:   [id number]      CT angio head w and wo IV contrast   Final Result   No evidence for significant stenosis of the cervical vessels.        No evidence for significant stenosis or large branch vessel cutoffs   of the intracranial vessels.        Atrophy of the left submandibular gland with large sialolith.        MACRO:   None        Signed by: Kizzy Gooden 12/14/2023 12:30 PM   Dictation workstation:   [id number]      CT angio neck   Final Result   No evidence for significant stenosis of the cervical vessels.        No evidence for significant stenosis or large branch vessel cutoffs   of the intracranial vessels.        Atrophy of the left submandibular gland with large sialolith.        MACRO:   None        Signed by: Kizzy Gooden 12/14/2023 12:30 PM   Dictation workstation:   [id number]      CT head wo IV contrast   Final Result   No evidence of acute cortical infarct or intracranial hemorrhage.        Stable appearing intracranial findings since comparison head CT   10/04/2023.        MACRO:   None                  Signed by: Sergei Piña 12/14/2023 12:21 PM   Dictation  workstation:   [id number]          ED Impression:  1. Vertigo            Critical care time: 0 (Zero) minutes.    Medical Decision Making  Patient stable.  Better with the meclizine.  Has had recurrent episodes of this issue.  Has not had any kind of definitive workup.  He is agreeable to go to [facility] for neurology and MRI.  Send by ground ambulance.  Called and spoke to the CDU there who accepted.    Problems Addressed:  Vertigo: acute illness or injury    Amount and/or Complexity of Data Reviewed  External Data Reviewed: labs, radiology, ECG and notes.  Labs: ordered. Decision-making details documented in ED Course.  Radiology: ordered and independent interpretation performed. Decision-making details documented in ED Course.  ECG/medicine tests: ordered and independent interpretation performed. Decision-making details documented in ED Course.  Discussion of management or test interpretation with external provider(s): [facility] KALINA Chao        Diagnosis Ruled In: See clinical impression above.  Diagnoses Ruled Out: Clinically significant traumatic brain injury, Intracranial hemorrhage, and STEMI    History Obtained From: Patient and Spouse/significant other    Test interpretations: See ED course if present.    Consideration for tests/treatments/admission not undertaken: None    Social Determinants of Health: None    MDM  Reviewed: vitals, nursing note and previous chart  Reviewed previous: labs, ECG, x-ray and CT scan  Interpretation: labs, ECG, x-ray and CT scan  Consults: admitting MD        No follow-ups on file.    Ludwig Zelaya DO  12/14/2023  Attending Emergency Physician    Clinician of Record Attestation: IDr. Ludwig DO, am the primary clinician of record.    Please note this report has been produced using speech recognition software, and may contain errors related to that system - Including errors in grammar, punctuation, and spelling, as well as words and phrases  that may be inappropriate. If there are any questions or concerns, please contact the dictating physician/physician assistant for clarification.                 Ludwig Armenta, DO  12/14/23 [id number]

## 2023-12-19 LAB
ATRIAL RATE: 50 BPM
P AXIS: 55 DEGREES
P OFFSET: 211 MS
P ONSET: 160 MS
PR INTERVAL: 136 MS
Q ONSET: 228 MS
QRS COUNT: 9 BEATS
QRS DURATION: 92 MS
QT INTERVAL: 462 MS
QTC CALCULATION(BAZETT): 421 MS
QTC FREDERICIA: 435 MS
R AXIS: -26 DEGREES
T AXIS: 31 DEGREES
T OFFSET: 459 MS
VENTRICULAR RATE: 50 BPM

## 2024-01-09 LAB
ATRIAL RATE: 62 BPM
P AXIS: 62 DEGREES
P OFFSET: 190 MS
P ONSET: 143 MS
PR INTERVAL: 134 MS
Q ONSET: 210 MS
QRS COUNT: 10 BEATS
QRS DURATION: 92 MS
QT INTERVAL: 428 MS
QTC CALCULATION(BAZETT): 434 MS
QTC FREDERICIA: 432 MS
R AXIS: -38 DEGREES
T AXIS: 47 DEGREES
T OFFSET: 424 MS
VENTRICULAR RATE: 62 BPM